# Patient Record
Sex: FEMALE | Race: WHITE | Employment: FULL TIME | URBAN - NONMETROPOLITAN AREA
[De-identification: names, ages, dates, MRNs, and addresses within clinical notes are randomized per-mention and may not be internally consistent; named-entity substitution may affect disease eponyms.]

---

## 2021-05-09 ENCOUNTER — HOSPITAL ENCOUNTER (EMERGENCY)
Age: 46
Discharge: HOME | End: 2021-05-09
Payer: COMMERCIAL

## 2021-05-09 VITALS
OXYGEN SATURATION: 98 % | RESPIRATION RATE: 16 BRPM | HEART RATE: 105 BPM | SYSTOLIC BLOOD PRESSURE: 148 MMHG | HEIGHT: 62 IN | BODY MASS INDEX: 48.76 KG/M2 | WEIGHT: 265 LBS | TEMPERATURE: 97.2 F | DIASTOLIC BLOOD PRESSURE: 82 MMHG

## 2021-05-09 DIAGNOSIS — R35.0 URINARY FREQUENCY: Primary | ICD-10-CM

## 2021-05-09 DIAGNOSIS — R81 ELEVATED SERUM GLUCOSE WITH GLUCOSURIA: ICD-10-CM

## 2021-05-09 DIAGNOSIS — R73.09 ELEVATED SERUM GLUCOSE WITH GLUCOSURIA: ICD-10-CM

## 2021-05-09 DIAGNOSIS — R30.0 DYSURIA: ICD-10-CM

## 2021-05-09 DIAGNOSIS — Z86.39 HISTORY OF TYPE 2 DIABETES MELLITUS: ICD-10-CM

## 2021-05-09 LAB
BILIRUBIN URINE: NEGATIVE
BLOOD, URINE: NEGATIVE
CHARACTER, URINE: CLEAR
COLOR: YELLOW
GLUCOSE BLD-MCNC: 136 MG/DL (ref 70–108)
GLUCOSE URINE: >= 1000 MG/DL
KETONES, URINE: ABNORMAL
LEUKOCYTE ESTERASE, URINE: NEGATIVE
NITRITE, URINE: NEGATIVE
PH UA: 6 (ref 5–9)
PROTEIN UA: NEGATIVE MG/DL
SPECIFIC GRAVITY UA: 1.01 (ref 1–1.03)
UROBILINOGEN, URINE: 0.2 EU/DL (ref 0.2–1)

## 2021-05-09 PROCEDURE — 99202 OFFICE O/P NEW SF 15 MIN: CPT

## 2021-05-09 PROCEDURE — 99202 OFFICE O/P NEW SF 15 MIN: CPT | Performed by: NURSE PRACTITIONER

## 2021-05-09 PROCEDURE — 82948 REAGENT STRIP/BLOOD GLUCOSE: CPT

## 2021-05-09 PROCEDURE — 81003 URINALYSIS AUTO W/O SCOPE: CPT

## 2021-05-09 RX ORDER — VITAMIN E 268 MG
400 CAPSULE ORAL DAILY
COMMUNITY

## 2021-05-09 RX ORDER — OMEPRAZOLE 20 MG/1
40 CAPSULE, DELAYED RELEASE ORAL DAILY
COMMUNITY

## 2021-05-09 RX ORDER — PRAVASTATIN SODIUM 20 MG
20 TABLET ORAL DAILY
COMMUNITY

## 2021-05-09 RX ORDER — LANOLIN ALCOHOL/MO/W.PET/CERES
5 CREAM (GRAM) TOPICAL DAILY
COMMUNITY

## 2021-05-09 RX ORDER — LORATADINE 10 MG/1
10 TABLET ORAL DAILY
COMMUNITY

## 2021-05-09 RX ORDER — M-VIT,TX,IRON,MINS/CALC/FOLIC 27MG-0.4MG
1 TABLET ORAL DAILY
COMMUNITY

## 2021-05-09 RX ORDER — MAGNESIUM OXIDE 400 MG/1
400 TABLET ORAL DAILY
COMMUNITY

## 2021-05-09 RX ORDER — GABAPENTIN 600 MG/1
600 TABLET ORAL 3 TIMES DAILY
COMMUNITY

## 2021-05-09 RX ORDER — EMPAGLIFLOZIN 25 MG/1
25 TABLET, FILM COATED ORAL DAILY
COMMUNITY

## 2021-05-09 RX ORDER — MONTELUKAST SODIUM 10 MG/1
10 TABLET ORAL NIGHTLY
COMMUNITY

## 2021-05-09 RX ORDER — DULOXETIN HYDROCHLORIDE 60 MG/1
60 CAPSULE, DELAYED RELEASE ORAL DAILY
COMMUNITY

## 2021-05-09 RX ORDER — PERPHENAZINE 16 MG
TABLET ORAL
COMMUNITY

## 2021-05-09 ASSESSMENT — ENCOUNTER SYMPTOMS
SHORTNESS OF BREATH: 0
BACK PAIN: 1
EYE REDNESS: 0
TROUBLE SWALLOWING: 0
EYE DISCHARGE: 0
COLOR CHANGE: 0
VOMITING: 0
NAUSEA: 0
ABDOMINAL PAIN: 0
EYE ITCHING: 0

## 2021-05-09 NOTE — ED NOTES
[place] [facility] [address] [facility].   Pt reports just coming [place] [facility] and reports she ate 1/2 piece cheesecake, 1/2 piece cake, glass sweet tea, salads, fruit, mashedpotatoes     Michael Barron RN  05/09/21 4440

## 2021-05-09 NOTE — ED PROVIDER NOTES
[facility]  Urgent Care Encounter       CHIEF COMPLAINT       Chief Complaint   Patient presents with    Back Pain    Dysuria     frequency adn incontence       Nurses Notes reviewed and I agree except as noted in the HPI. HISTORY OF PRESENT ILLNESS   April Petra Garrett is a 40 y.o. female who presents     The history is provided by the patient. No  was used. Dysuria   This is a new problem. The current episode started more than 2 days ago. The problem has not changed since onset. The patient is experiencing no pain. There has been no fever. There is a history of pyelonephritis. Associated symptoms include frequency, urgency and flank pain. Pertinent negatives include no chills, no sweats, no nausea, no vomiting and no hematuria. She has tried nothing for the symptoms. Her past medical history is significant for recurrent UTIs. Her past medical history does not include kidney stones or urological procedure. REVIEW OF SYSTEMS     Review of Systems   Constitutional: Negative for activity change, chills and fever. HENT: Negative for congestion and trouble swallowing. Eyes: Negative for discharge, redness and itching. Respiratory: Negative for shortness of breath. Gastrointestinal: Negative for abdominal pain, nausea and vomiting. Genitourinary: Positive for difficulty urinating, dysuria, flank pain, frequency and urgency. Negative for hematuria, vaginal discharge and vaginal pain. Musculoskeletal: Positive for back pain. Negative for joint swelling. Skin: Negative for color change and rash. Allergic/Immunologic: Negative for environmental allergies. Psychiatric/Behavioral: The patient is not nervous/anxious.         PAST MEDICAL HISTORY         Diagnosis Date    Allergies     Depression     Diabetes mellitus ([facility] [address]     Diastolic dysfunction     Hyperlipidemia     Neuropathy        SURGICALHISTORY     Patient  has a past surgical history that includes Arm Surgery; Tonsillectomy; laparotomy; and [place] tooth extraction. CURRENT MEDICATIONS       Current Discharge Medication List      CONTINUE these medications which have NOT CHANGED    Details   pravastatin (PRAVACHOL) 20 MG tablet Take 20 mg by mouth daily      montelukast (SINGULAIR) 10 MG tablet Take 10 mg by mouth nightly      empagliflozin (JARDIANCE) 25 MG tablet Take 25 mg by mouth daily      metoprolol tartrate (LOPRESSOR) 25 MG tablet Take 25 mg by mouth 2 times daily      loratadine (CLARITIN) 10 MG tablet Take 10 mg by mouth daily      DULoxetine (CYMBALTA) 60 MG extended release capsule Take 60 mg by mouth daily      omeprazole (PRILOSEC) 20 MG delayed release capsule Take 40 mg by mouth daily      Alpha-Lipoic Acid 600 MG CAPS Take by mouth      gabapentin (NEURONTIN) 600 MG tablet Take 600 mg by mouth 3 times daily. melatonin 3 MG TABS tablet Take 5 mg by mouth daily      diphenhydrAMINE (BENYLIN) 12.5 MG/5ML liquid Take 12.5 mg by mouth 4 times daily as needed for Allergies      metFORMIN (GLUCOPHAGE) 1000 MG tablet Take 1,000 mg by mouth 2 times daily (with meals)      vitamin E 400 UNIT capsule Take 400 Units by mouth daily      Multiple Vitamins-Minerals (THERAPEUTIC MULTIVITAMIN-MINERALS) tablet Take 1 tablet by mouth daily      Glucosamine-Chondroit-Vit C-Mn (GLUCOSAMINE CHONDR 500 COMPLEX PO) Take by mouth      magnesium oxide (MAG-OX) 400 MG tablet Take 400 mg by mouth daily             ALLERGIES     Patient is is allergic to augmentin [amoxicillin-pot clavulanate]; lodine [etodolac]; and zithromax [azithromycin]. Patients   There is no immunization history on file for this patient. FAMILY HISTORY     Patient's family history includes Diabetes in her mother; Heart Disease in her father. SOCIAL HISTORY     Patient  reports that she has never smoked. She has never used smokeless tobacco. She reports previous alcohol use. She reports previous drug use.     PHYSICAL EXAM ED TRIAGE VITALS  BP: (!) 148/82, Temp: 97.2 °F (36.2 °C), Pulse: 105, Resp: 16, SpO2: 98 %,Estimated body mass index is 48.47 kg/m² as calculated from the following:    Height as of this encounter: 5' 2\" (1.575 m). Weight as of this encounter: 265 lb (120.2 kg). ,Patient's last menstrual period was 01/14/2021. Physical Exam  Vitals signs and nursing note reviewed. Constitutional:       General: She is not in acute distress. Appearance: Normal appearance. She is well-developed. She is not ill-appearing, toxic-appearing or diaphoretic. HENT:      Head: Normocephalic. Right Ear: Tympanic membrane, ear canal and external ear normal.      Left Ear: Tympanic membrane, ear canal and external ear normal.      Nose: Nose normal.      Mouth/Throat:      Mouth: Mucous membranes are moist.      Pharynx: Oropharynx is clear. No oropharyngeal exudate or posterior oropharyngeal erythema. Eyes:      Pupils: Pupils are equal, round, and reactive to light. Neck:      Musculoskeletal: Full passive range of motion without pain, normal range of motion and neck supple. Cardiovascular:      Rate and Rhythm: Tachycardia present. Pulmonary:      Effort: Pulmonary effort is normal.      Breath sounds: Normal breath sounds. Abdominal:      General: Bowel sounds are normal. There is no distension. Palpations: There is no mass. Tenderness: There is no abdominal tenderness. There is no right CVA tenderness, left CVA tenderness or guarding. Musculoskeletal: Normal range of motion. Skin:     General: Skin is warm and dry. Capillary Refill: Capillary refill takes less than 2 seconds. Neurological:      Mental Status: She is alert and oriented to person, place, and time. Psychiatric:         Mood and Affect: Mood normal.         Behavior: Behavior normal. Behavior is cooperative.          DIAGNOSTIC RESULTS     Labs:  Results for orders placed or performed during the hospital encounter of 05/09/21   Urinalysis   Result Value Ref Range    Glucose, Ur >= 1000 (A) NEGATIVE mg/dl    Bilirubin Urine Negative NEGATIVE    Ketones, Urine Trace (A) NEGATIVE    Specific Gravity, UA 1.010 1.002 - 1.030    Blood, Urine Negative NEGATIVE    pH, UA 6.00 5.0 - 9.0    Protein, UA Negative NEGATIVE mg/dl    Urobilinogen, Urine 0.20 0.2 - 1.0 eu/dl    Nitrite, Urine Negative NEGATIVE    Leukocyte Esterase, Urine Negative NEGATIVE    Color, UA Yellow STRAW-YELLOW    Character, Urine Clear CLEAR-SL CLOUD       IMAGING:    No orders to display         EKG:      URGENT CARE COURSE:     Vitals:    05/09/21 1628   BP: (!) 148/82   Pulse: 105   Resp: 16   Temp: 97.2 °F (36.2 °C)   SpO2: 98%   Weight: 265 lb (120.2 kg)   Height: 5' 2\" (1.575 m)       Medications - No data to display         PROCEDURES:  None    FINAL IMPRESSION      1. Urinary frequency    2. Dysuria    3. Elevated serum glucose with glucosuria    4. History of type 2 diabetes mellitus          DISPOSITION/ PLAN        Patient or Patient designated representative was advised to drink plenty of water or fluids and take medication as prescribed. The patient or Patient designated representative is advised to monitor for any changes in pain, development of high fever, chills, persistent vomiting, development of increasing back or flank pain or increase in hematuria the patient is advised to go to the emergency department for reevaluation and further follow-up if they wouldn't notice any of the above symptoms. The patient did verbalize understanding of discharge instructions and is agreeable to the treatment plan. The patient left ambulatory without any changes or concerns in stable condition. PATIENT REFERRED TO:  No primary care provider on file. No primary physician on file.       DISCHARGE MEDICATIONS:  Current Discharge Medication List          Current Discharge Medication List          Current Discharge Medication List          Abraham Petit GERSON - CNP    (Please note that portions of this note were completed with a voice recognition program. Efforts were made to edit the dictations but occasionally words are mis-transcribed.)            GERSON Gonzalez CNP  05/09/21 6723